# Patient Record
(demographics unavailable — no encounter records)

---

## 2024-10-07 NOTE — REVIEW OF SYSTEMS
[Fever] : no fever [Chills] : chills [Fatigue] : fatigue [Recent Change In Weight] : ~T no recent weight change [Shortness Of Breath] : shortness of breath [Wheezing] : wheezing [Cough] : cough [Abdominal Pain] : abdominal pain [Nausea] : no nausea [Constipation] : no constipation [Vomiting] : no vomiting [Heartburn] : no heartburn [Melena] : no melena [Negative] : Integumentary

## 2024-10-07 NOTE — PHYSICAL EXAM
[Normal] : soft, non-tender, non-distended, no masses palpated, no HSM and normal bowel sounds [Normal Gait] : normal gait [Normal Affect] : the affect was normal [Normal Insight/Judgement] : insight and judgment were intact [de-identified] : Mild wheezing bilaterally

## 2024-10-07 NOTE — ASSESSMENT
[FreeTextEntry1] : .  Acute bronchitis persistent worsening cough, pain with cough at his abdominal surgery sites. Surgical sites nontender no distension no concern for any herniations. No bruising no evidence of rectus sheath hematoma.  Rx Augmentin, tessalon, albuterol inhaler, Robitussin-DM  Return precautions given, instructed to seek medical care if experiencing worsen symptoms, go to ED if experiencing SOB, chest pain, or other concerning symptom. Patient is AAOx3, expressed understanding.

## 2024-10-07 NOTE — HISTORY OF PRESENT ILLNESS
[FreeTextEntry8] : 58M c/o crohns here for acute visit 6 days of persistent worsening cough with yellowish sputum production, chills, decreased appetite, SOB when coughing, abdominal pain when coughing, and fatigue. His son was sick last week likely caught from him. His symptoms have progressed and worsened over the last week initially started as a head cold now worsened with increased cough. Patient has tried dayquil, nyquil, mucinex, robitussen, tylenol, advil with minimal relief of symptoms.

## 2024-11-06 NOTE — ASSESSMENT
[FreeTextEntry1] : . CPE Labs  Congratulated on alcohol cessation. Discussed modalities of treatment, offered supportive measures. For now he will continue on self-cessation. Feeling better since quitting, getting better sleep, has more energy.   Not yet interested in smoking cessation. 2PPD. Wishes to tackle issues one step at a time as per patient.   Afib Heart rate irregularly irregular  EKG performed today in clinic personally reviewed, Afib No prior hx of afib. CHADSVASC 0, HTN in the past but now controlled without medication.  Has previous cardiac workup in 2020 with Nuc stress and TTE Re-refer to Cardiology for new afib    Crohns disease h/o ileostomy, reversal. Symptoms controlled with diet.   Obesity  Patient counseled on diet and exercise 150mins of moderate intensity exercise/weekly Discussed conservative management for weight loss Discussed nutrition and dietician to help with improvement of diet. Counseled on AE of medications Discussed oral medications vs injectable medications. Discussed long term AE of weight loss meds including including nausea, diarrhea, vomiting, gallbladder disease, acute pancreatitis, MIGEL, or hypersensitivity reaction discussed long term effects of obesity. Pt declined any hx of MEN syndrome or thyroid medullary carcinoma. Patient here for obesity management and would benefit from GLP therapy as they have not been successful with lifestyle modifications such as reduced calorie diet and exercise regimen. Despite modifications made, patients BMI remains at 42.9. They would benefit from injectable GLP medication.  Patient has [employer], referred to [facility] program. Wegovy or Zepbound.    Colon Cancer Screening- colonoscopy 6/2023 follows with GI for Crohns Check PSA  Lung cancer screening- deferred, readdress future visit.

## 2024-11-06 NOTE — HEALTH RISK ASSESSMENT
[Good] : ~his/her~  mood as  good [No] : No [1 or 2 (0 pts)] : 1 or 2 (0 points) [Never (0 pts)] : Never (0 points) [0] : 2) Feeling down, depressed, or hopeless: Not at all (0) [PHQ-2 Negative - No further assessment needed] : PHQ-2 Negative - No further assessment needed [Current] : Current [20 or more] : 20 or more [] :  [Fully functional (bathing, dressing, toileting, transferring, walking, feeding)] : Fully functional (bathing, dressing, toileting, transferring, walking, feeding) [Fully functional (using the telephone, shopping, preparing meals, housekeeping, doing laundry, using] : Fully functional and needs no help or supervision to perform IADLs (using the telephone, shopping, preparing meals, housekeeping, doing laundry, using transportation, managing medications and managing finances) [Reports changes in hearing] : Reports no changes in hearing [Reports changes in vision] : Reports no changes in vision

## 2024-11-06 NOTE — PHYSICAL EXAM
Please refax to [phone].   [No Acute Distress] : no acute distress [Well Nourished] : well nourished [Well Developed] : well developed [Normal] : no rash [de-identified] : irregular rhythm

## 2024-11-06 NOTE — HISTORY OF PRESENT ILLNESS
[FreeTextEntry1] : CPE  [de-identified] : 58M current smoker, Crohns disease (controlled), here for CPE   He quit alcohol last month, decided that enough is enough, no withdrawals. Previously would drink 6-8 drinks. He is motivated to continue alcohol cessation.   Specialist GI- Crohns, Colonoscopy

## 2025-02-03 NOTE — PHYSICAL EXAM
[No Acute Distress] : no acute distress [Well Nourished] : well nourished [Well Developed] : well developed [Normal] : no rash [Right Foot Was Examined] : Right foot ~C was examined [Left Foot Was Examined] : left foot ~C was examined [None] : no ulcers in either foot were found [] : both feet [de-identified] : irregular rhythm

## 2025-02-03 NOTE — ASSESSMENT
[FreeTextEntry1] :  DM2  On Wegovy 0.5mg, increased dose to 1mg, follow up 1 month A1c check  Urine micro Has not been consistent with rosuvastatin yet, but he is willing to take nightly. All questions answered.  PCV20 given today 2/3/25 No foot neuropathy

## 2025-02-03 NOTE — HISTORY OF PRESENT ILLNESS
[FreeTextEntry1] : f/u obesity  [de-identified] : 58M current smoker, Crohns disease (controlled), here for follow up on obesity and DM2  In the process of alcohol cessation. Previously would drink 6-8 drinks. He's had occasional drinks but nowhere near what he had before.  Specialist GI- Crohns, Colonoscopy

## 2025-07-01 NOTE — PHYSICAL EXAM
[No Acute Distress] : no acute distress [PERRL] : pupils equal round and reactive to light [No Lymphadenopathy] : no lymphadenopathy [Regular Rhythm] : with a regular rhythm [No Murmur] : no murmur heard [No Edema] : there was no peripheral edema [Normal] : soft, non-tender, non-distended, no masses palpated, no HSM and normal bowel sounds [Normal Supraclavicular Nodes] : no supraclavicular lymphadenopathy [Normal Posterior Cervical Nodes] : no posterior cervical lymphadenopathy [Normal Anterior Cervical Nodes] : no anterior cervical lymphadenopathy [No CVA Tenderness] : no CVA  tenderness [No Joint Swelling] : no joint swelling [No Rash] : no rash [Normal Gait] : normal gait [Normal Affect] : the affect was normal [Normal Insight/Judgement] : insight and judgment were intact [de-identified] : nasl congestion  [de-identified] : balateral wheezes

## 2025-07-01 NOTE — ASSESSMENT
[FreeTextEntry1] : copd exacerbation given sample of breztri sinus rinse   doxycycline and tessalon and fonase